# Patient Record
Sex: MALE | URBAN - METROPOLITAN AREA
[De-identification: names, ages, dates, MRNs, and addresses within clinical notes are randomized per-mention and may not be internally consistent; named-entity substitution may affect disease eponyms.]

---

## 2022-10-31 ENCOUNTER — TELEPHONE (OUTPATIENT)
Dept: PSYCHIATRY | Facility: CLINIC | Age: 32
End: 2022-10-31

## 2022-10-31 NOTE — TELEPHONE ENCOUNTER
Patient was added to the non referral wait list for both talk therapy and med mgmt wait list     No preference in provider, prefers [facility] for [facility] and is interested in the virtual platform for therapy

## 2022-11-03 NOTE — TELEPHONE ENCOUNTER
Returned voice message from intake for scheduling  Pt was informed that call may come up as spam     If he not reached today- he is available anytime before 1 pm tomorrow

## 2023-10-16 ENCOUNTER — TELEPHONE (OUTPATIENT)
Dept: PSYCHIATRY | Facility: CLINIC | Age: 33
End: 2023-10-16

## 2023-10-16 NOTE — LETTER
Dear Jen Fay : We are contacting you because your name is currently included on the [address] [place] [address] wait-list for Talk Therapy and/or Medication Management. (Please [place] which services are needed)     In our efforts to provide the highest quality care, [facility] has begun the process of upgrading our behavioral health systems to increase efficiency and expedite delivery of services. As part of this process, we ask you to please confirm your continued interest in the services above. If you are no longer interested or in need, please franci “No” in the area below. If you are still interested and in need, please franci “Yes” and provide your most current demographic and insurance information within 15 days. If we do not receive confirmation from you by 2023 your information will not be included in the system upgrade and your place on the waitlist will be lost.     Thank you in advance for your patience and understanding and we apologize for any inconvenience this may cause. Patient Name and :    Still in need of services: Yes or No     Current Address:     Phone#:     Best time to receive a call: Insurance Carrier:      Policy/ID#: Group#: Insurance Services Phone#:      What is your current presenting problem? Open to virtual talk therapy: Yes or No     We will call you to do an Intake when an appointment becomes available. You can send this information back to us in any of the ways below:    Email: [email or web address] Rayna Luong  Fax#:  [phone]  Mail:   [address] [place] [address], [address] [place] [address]